# Patient Record
Sex: FEMALE | Race: WHITE | NOT HISPANIC OR LATINO | Employment: FULL TIME | URBAN - METROPOLITAN AREA
[De-identification: names, ages, dates, MRNs, and addresses within clinical notes are randomized per-mention and may not be internally consistent; named-entity substitution may affect disease eponyms.]

---

## 2024-01-27 ENCOUNTER — APPOINTMENT (OUTPATIENT)
Dept: CT IMAGING | Facility: HOSPITAL | Age: 34
End: 2024-01-27
Payer: COMMERCIAL

## 2024-01-27 ENCOUNTER — HOSPITAL ENCOUNTER (EMERGENCY)
Facility: HOSPITAL | Age: 34
Discharge: HOME | End: 2024-01-27
Attending: EMERGENCY MEDICINE
Payer: COMMERCIAL

## 2024-01-27 VITALS
TEMPERATURE: 98.4 F | HEIGHT: 62 IN | DIASTOLIC BLOOD PRESSURE: 81 MMHG | RESPIRATION RATE: 20 BRPM | SYSTOLIC BLOOD PRESSURE: 127 MMHG | HEART RATE: 127 BPM | OXYGEN SATURATION: 98 % | WEIGHT: 142 LBS | BODY MASS INDEX: 26.13 KG/M2

## 2024-01-27 DIAGNOSIS — S09.90XA CLOSED HEAD INJURY, INITIAL ENCOUNTER: Primary | ICD-10-CM

## 2024-01-27 DIAGNOSIS — J10.1 INFLUENZA B: ICD-10-CM

## 2024-01-27 DIAGNOSIS — S16.1XXA STRAIN OF NECK MUSCLE, INITIAL ENCOUNTER: ICD-10-CM

## 2024-01-27 DIAGNOSIS — R79.89 ELEVATED D-DIMER: ICD-10-CM

## 2024-01-27 DIAGNOSIS — R00.0 TACHYCARDIA: ICD-10-CM

## 2024-01-27 LAB
ALBUMIN SERPL-MCNC: 4.5 G/DL (ref 3.5–5.2)
ALBUMIN/GLOB SERPL: 1.6 G/DL
ALP SERPL-CCNC: 37 U/L (ref 39–117)
ALT SERPL W P-5'-P-CCNC: 21 U/L (ref 1–33)
ANION GAP SERPL CALCULATED.3IONS-SCNC: 10.9 MMOL/L (ref 5–15)
AST SERPL-CCNC: 18 U/L (ref 1–32)
BASOPHILS # BLD AUTO: 0.01 10*3/MM3 (ref 0–0.2)
BASOPHILS NFR BLD AUTO: 0.3 % (ref 0–1.5)
BILIRUB SERPL-MCNC: 0.2 MG/DL (ref 0–1.2)
BILIRUB UR QL STRIP: NEGATIVE
BUN SERPL-MCNC: 8 MG/DL (ref 6–20)
BUN/CREAT SERPL: 10.7 (ref 7–25)
CALCIUM SPEC-SCNC: 8.9 MG/DL (ref 8.6–10.5)
CHLORIDE SERPL-SCNC: 103 MMOL/L (ref 98–107)
CLARITY UR: CLEAR
CO2 SERPL-SCNC: 24.1 MMOL/L (ref 22–29)
COLOR UR: YELLOW
CREAT SERPL-MCNC: 0.75 MG/DL (ref 0.57–1)
D DIMER PPP FEU-MCNC: 0.56 MCGFEU/ML (ref 0–0.5)
DEPRECATED RDW RBC AUTO: 40.1 FL (ref 37–54)
EGFRCR SERPLBLD CKD-EPI 2021: 109.3 ML/MIN/1.73
EOSINOPHIL # BLD AUTO: 0 10*3/MM3 (ref 0–0.4)
EOSINOPHIL NFR BLD AUTO: 0 % (ref 0.3–6.2)
ERYTHROCYTE [DISTWIDTH] IN BLOOD BY AUTOMATED COUNT: 11.6 % (ref 12.3–15.4)
FLUAV SUBTYP SPEC NAA+PROBE: NOT DETECTED
FLUBV RNA ISLT QL NAA+PROBE: DETECTED
GLOBULIN UR ELPH-MCNC: 2.8 GM/DL
GLUCOSE SERPL-MCNC: 105 MG/DL (ref 65–99)
GLUCOSE UR STRIP-MCNC: NEGATIVE MG/DL
HCG INTACT+B SERPL-ACNC: <1 MIU/ML
HCT VFR BLD AUTO: 42.9 % (ref 34–46.6)
HGB BLD-MCNC: 13.6 G/DL (ref 12–15.9)
HGB UR QL STRIP.AUTO: NEGATIVE
IMM GRANULOCYTES # BLD AUTO: 0.01 10*3/MM3 (ref 0–0.05)
IMM GRANULOCYTES NFR BLD AUTO: 0.3 % (ref 0–0.5)
KETONES UR QL STRIP: NEGATIVE
LEUKOCYTE ESTERASE UR QL STRIP.AUTO: NEGATIVE
LYMPHOCYTES # BLD AUTO: 0.85 10*3/MM3 (ref 0.7–3.1)
LYMPHOCYTES NFR BLD AUTO: 24.6 % (ref 19.6–45.3)
MCH RBC QN AUTO: 29.6 PG (ref 26.6–33)
MCHC RBC AUTO-ENTMCNC: 31.7 G/DL (ref 31.5–35.7)
MCV RBC AUTO: 93.5 FL (ref 79–97)
MONOCYTES # BLD AUTO: 0.51 10*3/MM3 (ref 0.1–0.9)
MONOCYTES NFR BLD AUTO: 14.8 % (ref 5–12)
NEUTROPHILS NFR BLD AUTO: 2.07 10*3/MM3 (ref 1.7–7)
NEUTROPHILS NFR BLD AUTO: 60 % (ref 42.7–76)
NITRITE UR QL STRIP: NEGATIVE
PH UR STRIP.AUTO: 6 [PH] (ref 5–8)
PLATELET # BLD AUTO: 209 10*3/MM3 (ref 140–450)
PMV BLD AUTO: 9.2 FL (ref 6–12)
POTASSIUM SERPL-SCNC: 3.6 MMOL/L (ref 3.5–5.2)
PROT SERPL-MCNC: 7.3 G/DL (ref 6–8.5)
PROT UR QL STRIP: NEGATIVE
QT INTERVAL: 283 MS
QTC INTERVAL: 403 MS
RBC # BLD AUTO: 4.59 10*6/MM3 (ref 3.77–5.28)
SARS-COV-2 RNA RESP QL NAA+PROBE: NOT DETECTED
SODIUM SERPL-SCNC: 138 MMOL/L (ref 136–145)
SP GR UR STRIP: 1.01 (ref 1–1.03)
TROPONIN T SERPL HS-MCNC: <6 NG/L
UROBILINOGEN UR QL STRIP: NORMAL
WBC NRBC COR # BLD AUTO: 3.45 10*3/MM3 (ref 3.4–10.8)

## 2024-01-27 PROCEDURE — 80053 COMPREHEN METABOLIC PANEL: CPT

## 2024-01-27 PROCEDURE — 36415 COLL VENOUS BLD VENIPUNCTURE: CPT

## 2024-01-27 PROCEDURE — 84702 CHORIONIC GONADOTROPIN TEST: CPT

## 2024-01-27 PROCEDURE — 84484 ASSAY OF TROPONIN QUANT: CPT

## 2024-01-27 PROCEDURE — 93005 ELECTROCARDIOGRAM TRACING: CPT | Performed by: EMERGENCY MEDICINE

## 2024-01-27 PROCEDURE — 81003 URINALYSIS AUTO W/O SCOPE: CPT

## 2024-01-27 PROCEDURE — 87636 SARSCOV2 & INF A&B AMP PRB: CPT

## 2024-01-27 PROCEDURE — 85025 COMPLETE CBC W/AUTO DIFF WBC: CPT

## 2024-01-27 PROCEDURE — 72125 CT NECK SPINE W/O DYE: CPT

## 2024-01-27 PROCEDURE — 70450 CT HEAD/BRAIN W/O DYE: CPT

## 2024-01-27 PROCEDURE — 99284 EMERGENCY DEPT VISIT MOD MDM: CPT

## 2024-01-27 PROCEDURE — 85379 FIBRIN DEGRADATION QUANT: CPT

## 2024-01-27 RX ORDER — ACETAMINOPHEN 500 MG
1000 TABLET ORAL ONCE
Status: DISCONTINUED | OUTPATIENT
Start: 2024-01-27 | End: 2024-01-27 | Stop reason: HOSPADM

## 2024-01-27 RX ORDER — SODIUM CHLORIDE 0.9 % (FLUSH) 0.9 %
10 SYRINGE (ML) INJECTION AS NEEDED
Status: DISCONTINUED | OUTPATIENT
Start: 2024-01-27 | End: 2024-01-27 | Stop reason: HOSPADM

## 2024-01-27 NOTE — FSED PROVIDER NOTE
[facility]    EMERGENCY DEPARTMENT ENCOUNTER    Room Number:  06/06  Date seen:  1/27/2024  Time seen: 11:11 EST  PCP: Provider, No Known  Historian: patient     HPI:  Chief complaint:head injury  Context:Marielos COLON is a 33 y.o. female who presents to the ED with c/o head injury.  Patient reports that she had a near syncopal episode Thursday night.  She reports that she took NyQuil before she went to bed.  She reports that she woke up to go to the restroom and when she was walking back to the bed she felt like she was going to pass out and fell.  Reports that she does have a history of vasovagal episodes and tachycardia.  Patient reports that she did hit her head.  Reports that she has been having head and neck pain over the last 2 days since the fall.  Patient also reports that she has been having some cough and congestion.  Patient states that there currently trying to get pregnant and would like to be tested for that at this time.    Timing: Constant  Duration: 2 days  Location: Head neck  Radiation: Radiating  Quality: Aching  Intensity/Severity: Mild  Associated Symptoms: Near syncopal episode, head injury, head neck pain  Aggravating Factors: Movement, palpation  Alleviating Factors: No attempted home treatment      MEDICAL RECORD REVIEW  Tachycardia, near syncopal episodes    ALLERGIES  Azithromycin    PAST MEDICAL HISTORY  Active Ambulatory Problems     Diagnosis Date Noted    No Active Ambulatory Problems     Resolved Ambulatory Problems     Diagnosis Date Noted    No Resolved Ambulatory Problems     Past Medical History:   Diagnosis Date    Arthritis     Degenerative cervical disc     Tachycardia        PAST SURGICAL HISTORY  Past Surgical History:   Procedure Laterality Date    HAND ARTHROPLASTY Left        FAMILY HISTORY  History reviewed. No pertinent family history.    SOCIAL HISTORY  Social History     Socioeconomic History    Marital status:     Tobacco Use    Smoking status: Never    Smokeless tobacco: Never   Vaping Use    Vaping Use: Never used       REVIEW OF SYSTEMS  Review of Systems    All systems reviewed and negative except for those discussed in HPI.     PHYSICAL EXAM    I have reviewed the triage vital signs and nursing notes.    ED Triage Vitals   Temp Heart Rate Resp BP SpO2   01/27/24 1002 01/27/24 1001 01/27/24 1001 01/27/24 1001 01/27/24 1001   98.4 °F (36.9 °C) (!) 127 20 127/81 98 %      Temp src Heart Rate Source Patient Position BP Location FiO2 (%)   01/27/24 1002 -- -- -- --   Oral           Physical Exam  Constitutional:       Appearance: Normal appearance. She is not toxic-appearing.   HENT:      Nose: Nose normal.      Mouth/Throat:      Mouth: Mucous membranes are moist.      Pharynx: Oropharynx is clear.   Eyes:      Pupils: Pupils are equal, round, and reactive to light.   Neck:      Trachea: Trachea and phonation normal.   Cardiovascular:      Rate and Rhythm: Regular rhythm. Tachycardia present.      Pulses: Normal pulses.      Heart sounds: Normal heart sounds.   Pulmonary:      Effort: Pulmonary effort is normal.      Breath sounds: Normal breath sounds.   Musculoskeletal:      Cervical back: Normal range of motion and neck supple. No swelling, edema, deformity, rigidity, tenderness or bony tenderness. Muscular tenderness present. No spinous process tenderness. Decreased range of motion (Slightly decreased due to pain).      Thoracic back: Normal.      Lumbar back: Normal.   Skin:     General: Skin is warm.      Capillary Refill: Capillary refill takes less than 2 seconds.   Neurological:      General: No focal deficit present.      Mental Status: She is alert.      GCS: GCS eye subscore is 4. GCS verbal subscore is 5. GCS motor subscore is 6.      Sensory: Sensation is intact.      Motor: Motor function is intact.      Coordination: Coordination is intact.      Gait: Gait is intact.   Psychiatric:         Mood and Affect:  Mood normal.         Behavior: Behavior normal.       PERC Rule for Pulmonary Embolism from [employer]  on 1/27/2024  ** All calculations should be rechecked by clinician prior to use **    RESULT SUMMARY:  1 criteria  If any criteria are positive, the PERC rule cannot be used to rule out PE in this patient.      INPUTS:  Age ?50 --> 0 = No  HR ?100 --> 1 = Yes  O? sat on room air  --> 0 = No  Unilateral leg swelling --> 0 = No  Hemoptysis --> 0 = No  Recent surgery or trauma --> 0 = No  Prior PE or DVT --> 0 = No  Hormone use --> 0 = No    Vital signs and nursing notes reviewed.        LAB RESULTS  Recent Results (from the past 24 hour(s))   COVID-19 and FLU A/B PCR, 1 HR TAT - Swab, Nasopharynx    Collection Time: 01/27/24 10:08 AM    Specimen: Nasopharynx; Swab   Result Value Ref Range    COVID19 Not Detected Not Detected - Ref. Range    Influenza A PCR Not Detected Not Detected    Influenza B PCR Detected (A) Not Detected   ECG 12 Lead Syncope    Collection Time: 01/27/24 10:09 AM   Result Value Ref Range    QT Interval 283 ms    QTC Interval 403 ms   Urinalysis With Culture If Indicated - Urine, Clean Catch    Collection Time: 01/27/24 11:00 AM    Specimen: Urine, Clean Catch   Result Value Ref Range    Color, UA Yellow Yellow, Straw    Appearance, UA Clear Clear    pH, UA 6.0 5.0 - 8.0    Specific Gravity, UA 1.015 1.005 - 1.030    Glucose, UA Negative Negative    Ketones, UA Negative Negative    Bilirubin, UA Negative Negative    Blood, UA Negative Negative    Protein, UA Negative Negative    Leuk Esterase, UA Negative Negative    Nitrite, UA Negative Negative    Urobilinogen, UA 0.2 E.U./dL 0.2 - 1.0 E.U./dL   Comprehensive Metabolic Panel    Collection Time: 01/27/24 11:00 AM    Specimen: Blood   Result Value Ref Range    Glucose 105 (H) 65 - 99 mg/dL    BUN 8 6 - 20 mg/dL    Creatinine 0.75 0.57 - 1.00 mg/dL    Sodium 138 136 - 145 mmol/L    Potassium 3.6 3.5 - 5.2 mmol/L    Chloride 103 98 - 107 mmol/L     CO2 24.1 22.0 - 29.0 mmol/L    Calcium 8.9 8.6 - 10.5 mg/dL    Total Protein 7.3 6.0 - 8.5 g/dL    Albumin 4.5 3.5 - 5.2 g/dL    ALT (SGPT) 21 1 - 33 U/L    AST (SGOT) 18 1 - 32 U/L    Alkaline Phosphatase 37 (L) 39 - 117 U/L    Total Bilirubin 0.2 0.0 - 1.2 mg/dL    Globulin 2.8 gm/dL    A/G Ratio 1.6 g/dL    BUN/Creatinine Ratio 10.7 7.0 - 25.0    Anion Gap 10.9 5.0 - 15.0 mmol/L    eGFR 109.3 >60.0 mL/min/1.73   D-dimer, Quantitative    Collection Time: 01/27/24 11:00 AM    Specimen: Blood   Result Value Ref Range    D-Dimer, Quantitative 0.56 (H) 0.00 - 0.50 MCGFEU/mL   Single High Sensitivity Troponin T    Collection Time: 01/27/24 11:00 AM    Specimen: Blood   Result Value Ref Range    HS Troponin T <6 <14 ng/L   hCG, Quantitative, Pregnancy    Collection Time: 01/27/24 11:00 AM    Specimen: Blood   Result Value Ref Range    HCG Quantitative <1.00 mIU/mL   CBC Auto Differential    Collection Time: 01/27/24 11:00 AM    Specimen: Blood   Result Value Ref Range    WBC 3.45 3.40 - 10.80 10*3/mm3    RBC 4.59 3.77 - 5.28 10*6/mm3    Hemoglobin 13.6 12.0 - 15.9 g/dL    Hematocrit 42.9 34.0 - 46.6 %    MCV 93.5 79.0 - 97.0 fL    MCH 29.6 26.6 - 33.0 pg    MCHC 31.7 31.5 - 35.7 g/dL    RDW 11.6 (L) 12.3 - 15.4 %    RDW-SD 40.1 37.0 - 54.0 fl    MPV 9.2 6.0 - 12.0 fL    Platelets 209 140 - 450 10*3/mm3    Neutrophil % 60.0 42.7 - 76.0 %    Lymphocyte % 24.6 19.6 - 45.3 %    Monocyte % 14.8 (H) 5.0 - 12.0 %    Eosinophil % 0.0 (L) 0.3 - 6.2 %    Basophil % 0.3 0.0 - 1.5 %    Immature Grans % 0.3 0.0 - 0.5 %    Neutrophils, Absolute 2.07 1.70 - 7.00 10*3/mm3    Lymphocytes, Absolute 0.85 0.70 - 3.10 10*3/mm3    Monocytes, Absolute 0.51 0.10 - 0.90 10*3/mm3    Eosinophils, Absolute 0.00 0.00 - 0.40 10*3/mm3    Basophils, Absolute 0.01 0.00 - 0.20 10*3/mm3    Immature Grans, Absolute 0.01 0.00 - 0.05 10*3/mm3       Ordered the above labs and independently reviewed the results.      RADIOLOGY RESULTS  CT Head Without  Contrast    Result Date: 1/27/2024  CT HEAD WO CONTRAST Date of Exam: 1/27/2024 11:40 AM EST Indication: head and neck injury post fall/near syncope. Comparison: None available. Technique: Axial CT images were obtained of the head without contrast administration.  Coronal reconstructions were performed.  Automated exposure control and iterative reconstruction methods were used. FINDINGS:  The brain parenchyma appears unremarkable in volume and morphology.  No significant mass effect, midline shift, intracranial hemorrhage, or hydrocephalus is identified. No extra-axial fluid collection is identified.   The calvarium and overlying soft tissues are unremarkable. The paranasal sinuses and bilateral mastoid air cells are adequately aerated. The visualized bony orbits, globes, and retrobulbar soft tissues are unremarkable.     1.No acute intracranial abnormality is identified. Electronically Signed: Bin Blankenship MD  1/27/2024 11:56 AM EST  Workstation ID: [id number]    CT Cervical Spine Without Contrast    Result Date: 1/27/2024  CT CERVICAL SPINE WO CONTRAST Date of Exam: 1/27/2024 11:40 AM EST Indication: head and neck injury post fall/near syncope. Comparison: None available. Technique: Axial CT images were obtained of the cervical spine without contrast administration.  Sagittal and coronal reconstructions were performed.  Automated exposure control and iterative reconstruction methods were used. Findings: Cervical vertebral body heights and alignment are normal. Intervertebral disc spaces are maintained. Skull base is intact. C1 and the odontoid process are intact. The spinous and transverse processes are intact. No jumped or perched facets. No epidural hematoma. No significant spinal canal or foraminal narrowing. The thyroid gland is normal. The airway is clear. The lung apices are clear. No cervical adenopathy.     No acute fracture is demonstrated. Electronically Signed: Kaushik Christine MD  1/27/2024 11:56 AM EST   Workstation ID: [id number]        I ordered the above noted radiological studies. Independently reviewed by me and discussed with radiologist.  See dictation above for official radiology interpretation.      Orders placed during this visit:  Orders Placed This Encounter   Procedures    COVID-19 and FLU A/B PCR, 1 HR TAT - Swab, Nasopharynx    CT Head Without Contrast    CT Cervical Spine Without Contrast    Urinalysis With Culture If Indicated - Urine, Clean Catch    Comprehensive Metabolic Panel    D-dimer, Quantitative    Single High Sensitivity Troponin T    hCG, Quantitative, Pregnancy    CBC Auto Differential    NPO Diet NPO Type: Strict NPO    Undress & Gown    Continuous Pulse Oximetry    Vital Signs    Orthostatic Blood Pressure    Oxygen Therapy- Nasal Cannula; Titrate 1-6 LPM Per SpO2; 90 - 95%    POC Glucose Once    ECG 12 Lead Syncope    Insert Peripheral IV    Fall Precautions    CBC & Differential    ED Acknowledgement Form Needed;           PROCEDURES    Procedures        MEDICATIONS GIVEN IN ER    Medications   acetaminophen (TYLENOL) tablet 1,000 mg (1,000 mg Oral Not Given 1/27/24 1010)   sodium chloride 0.9 % flush 10 mL (has no administration in time range)   sodium chloride 0.9 % bolus 1,000 mL (1,000 mL Intravenous Not Given 1/27/24 1043)         PROGRESS, DATA ANALYSIS, CONSULTS, AND MEDICAL DECISION MAKING    All labs have been independently reviewed by me.  All radiology studies have been reviewed by me.   EKG's independently reviewed by me.  Discussion below represents my analysis of pertinent findings related to patient's condition, differential diagnosis, treatment plan and final disposition.    I rechecked the patient.  I discussed the patient's labs, radiology findings (including all incidental findings), diagnosis, and plan for discharge.  A repeat exam reveals no new worrisome changes from my initial exam findings.  The patient understands that the fact that they are being discharged  does not denote that nothing is abnormal, it indicates that no clinical emergency is present and that they must follow-up as directed in order to properly maintain their health.  Follow-up instructions (specifically listed below) and return to ER precautions were given at this time.  I specifically instructed the patient to follow-up with their PCP.  The patient understands and agrees with the plan, and is ready for discharge.  All questions answered.    ED Course as of 01/27/24 1253   Sat Jan 27, 2024   1036 Influenza B PCR(!): Detected [KJ]   1049 Nursing staff tried to obtain the blood work from the patient but she reports that she is not here for blood work and does not know why we are doing it. Upon talking to the patient she reports that she has had episode of near syncope in the past and this is similar to what she has done previously.  I did inform her that her heart rate was elevated and she does not currently have a fever so I do have concerns that there may be something else that is going on that caused her near syncope.  Patient reports that she has been diagnosed with tachycardia in the past and that she does not think that this has anything to do with why she fell.  I did inform the patient that this is her choice whether or not we do this.  Patient is agreeable to a straight stick for blood work and CT scan. [KJ]   1210 CT Head Without Contrast  IMPRESSION:  1.No acute intracranial abnormality is identified.  Electronically Signed: Bin Blankenship MD    1/27/2024 11:56 AM EST  [KJ]   1210 CT Cervical Spine Without Contrast  IMPRESSION:  No acute fracture is demonstrated.  Electronically Signed: Kaushik Christine MD    1/27/2024 11:56 AM EST  [KJ]   1234 Patient was informed of elevated D-dimer.  She reports that this time she does not want to have a CT PE protocol and did not come in here for that.  I did inform her that without the CT I am not able to fully evaluate her for possible blood clots.   Patient's blood work is otherwise unremarkable.  She does report that she has had these near syncopal episodes and tachycardia in the past.  Patient is also noted to have influenza so the elevated D-dimer may be related to that.  Patient and I discussed the risks and benefits of not scanning her chest at this time.  Patient was informed that if she does have a blood clot in her lungs there could be catastrophic outcomes.  Patient acknowledges these risks and benefits and continues to decline a CT at this time. [KJ]   0294 I was again called to the patients room to discuss her findings when the nursing staff attempted to discharge the patient. Patient and  reports that they did not feel that she was evaluated for a concussion. I did reassure her several times that I did palpate her head and neck previously.  reports that he is concerned that she has a concussion as when she has fallen in the past the patient had spoken with a doctor that she worked with and they thought she had a concussion. The patients fall was on Thursday and the patient has been acting normally per her . She has not had any vomiting, confusion, vision changes etc. The patient and  were informed that her exam today is reassuring. I did recommend that she refrain from any activities that increase the pain. I did offer the patient steroids but she again declines. The patient was encouraged to follow up with her primary care provider for continued evaluation and given strict return precautions.  [KJ]      ED Course User Index  [KJ] Sania Faulkner, BIJAL       AS OF 12:53 EST VITALS:    BP - 127/81  HR - (!) 127  TEMP - 98.4 °F (36.9 °C) (Oral)  02 SATS - 98%    Medical Decision Making  MEDICAL DECISION  Patient is a 33-year-old female presents today after a near syncopal episode 2 days ago.  She reports that she fell hitting her head and neck and has been having pain since then.  She also reports that she has been  having cough congestion and fever at home.  Given history, exam and workup, low suspicion for HF, ICH (no trauma, headache), seizure (no witnessed seizure like activity, no postictal period, tongue laceration, bladder incontinence), stroke (no focal neuro deficits), HOCM (no murmur, family history of sudden death), ACS (neg troponin, no anginal pain), aortic dissection (no chest pain), malignant arrhythmia on ekg or any family history of sudden death, or GI bleed (stable hgb).  Patient was initially very hesitant about getting blood work done as she reports that she is not here for that.  Please see other charting regarding her conversation with the blood work.  I did also inform the patient of her elevated D-dimer which she also declines further testing for today.  She reports that she does not think that this is what is causing her symptoms.  She reports that she has had near syncopal episodes and tachycardia in the past.  Patient I discussed the risks and benefits of not evaluating the elevated D-dimer.  I did inform her that there could be catastrophic outcomes if she does have a PE and is not evaluated for it.  Patient understands these risks.  She is of sound mind and is able to make her own medical decisions.  Patient is out of the window for Tamiflu and this could be why her D-dimer is elevated due to her the influenza.  Patient had a CT of her head and neck which showed no acute findings.  Patient I discussed her discharge instructions.  I did recommend that she follow-up with her primary care provider and/or in orthopedic doctor.  Patient was given strict return precautions with understanding.    Problems Addressed:  Closed head injury, initial encounter: complicated acute illness or injury  Elevated d-dimer: complicated acute illness or injury  Influenza B: complicated acute illness or injury  Strain of neck muscle, initial encounter: complicated acute illness or injury  Tachycardia: complicated acute  illness or injury    Amount and/or Complexity of Data Reviewed  Labs: ordered. Decision-making details documented in ED Course.  Radiology: ordered. Decision-making details documented in ED Course.  ECG/medicine tests: ordered.    Risk  OTC drugs.  Prescription drug management.          DIAGNOSIS  Final diagnoses:   Closed head injury, initial encounter   Tachycardia   Strain of neck muscle, initial encounter   Elevated d-dimer   Influenza B       New Medications Ordered This Visit   Medications    acetaminophen (TYLENOL) tablet 1,000 mg    sodium chloride 0.9 % flush 10 mL    sodium chloride 0.9 % bolus 1,000 mL           I performed hand hygiene on entry into the pt room and upon exit.     Note Disclaimer: At [facility], we believe that sharing information builds trust and better relationships. You are receiving this note because you recently visited [facility]. It is possible you will see health information before a provider has talked with you about it. This kind of information can be easy to misunderstand. To help you fully understand what it means for your health, we urge you to discuss this note with your provider.         Part of this note may be an electronic transcription/translation of spoken language to printed text using the Dragon Dictation System.     Appropriate PPE worn during exam.    Dictated utilizing Dragon dictation     Note Disclaimer: At [facility], we believe that sharing information builds trust and better relationships. You are receiving this note because you recently visited [facility]. It is possible you will see health information before a provider has talked with you about it. This kind of information can be easy to misunderstand. To help you fully understand what it means for your health, we urge you to discuss this note with your provider.

## 2024-01-27 NOTE — DISCHARGE INSTRUCTIONS
Thank you for letting us care for you today.  You can take Tylenol and ibuprofen as needed for pain.  You can apply ice or heat to the sore area for 20 minutes at a time.  I recommend performing light range of motion exercises as tolerated.  You can also do light massage into the area.  Refrain from any activities that increase or worsen the pain.  Please follow-up with your primary care provider or the orthopedic doctor listed below for continued evaluation if your pain persist.  You were noted to have an elevated D-dimer today and recommended to have a CT of your chest to evaluate for possible blood clot in your lungs.  You did decline this testing at this time.  Immediately return to the emergency room for any shortness of breath, chest pain,, numbness or tingling or any other concerning symptoms.

## 2024-01-27 NOTE — Clinical Note
[facility] [place] [address] [place] [address] [place] [address]  Phone: [phone]    Marielos COLON was seen and treated in our emergency department on 1/27/2024.  She may return to work on 01/30/2024.         Thank you for choosing [facility].    Sania Faulkner APRN